# Patient Record
Sex: FEMALE | Race: WHITE | NOT HISPANIC OR LATINO | Employment: FULL TIME | URBAN - METROPOLITAN AREA
[De-identification: names, ages, dates, MRNs, and addresses within clinical notes are randomized per-mention and may not be internally consistent; named-entity substitution may affect disease eponyms.]

---

## 2024-04-08 ENCOUNTER — OFFICE VISIT (OUTPATIENT)
Dept: OPHTHALMOLOGY | Facility: CLINIC | Age: 29
End: 2024-04-08

## 2024-04-08 DIAGNOSIS — H18.622 UNSTABLE KERATOCONUS OF LEFT EYE: Primary | ICD-10-CM

## 2024-04-08 DIAGNOSIS — Z01.00 NORMAL EYE EXAM: ICD-10-CM

## 2024-04-08 LAB
CENTRAL CORNEA THICKNESS (OS): 279 MICRONS
KMAX (OD): 53.2 DIOPTERS
KMAX (OS): 81.8 DIOPTERS
PACH THINNEST (OD): 510 MICRONS
SIMK FLAT (OD): 42.6 DIOPTERS
SIMK FLAT (OS): 70 DIOPTERS
SIMK STEEP (OD): 46.1 DIOPTERS
SIMK STEEP (OS): 75.4 DIOPTERS

## 2024-04-08 PROCEDURE — V2531 CONTACT LENS GAS PERMEABLE: HCPCS | Performed by: OPTOMETRIST

## 2024-04-08 PROCEDURE — 92072 FITG C-LENS KERATOCONUS 1ST: CPT | Performed by: OPTOMETRIST

## 2024-04-08 PROCEDURE — 92025 CPTRIZED CORNEAL TOPOGRAPHY: CPT | Performed by: OPTOMETRIST

## 2024-04-08 RX ORDER — BUPROPION HYDROCHLORIDE 300 MG/1
300 TABLET ORAL DAILY
COMMUNITY

## 2024-04-08 RX ORDER — VENLAFAXINE HYDROCHLORIDE 75 MG/1
75 CAPSULE, EXTENDED RELEASE ORAL DAILY
COMMUNITY

## 2024-04-08 ASSESSMENT — CONF VISUAL FIELD
OS_INFERIOR_NASAL_RESTRICTION: 0
OD_SUPERIOR_TEMPORAL_RESTRICTION: 0
OS_SUPERIOR_NASAL_RESTRICTION: 0
OD_NORMAL: 1
OD_INFERIOR_NASAL_RESTRICTION: 0
OS_SUPERIOR_TEMPORAL_RESTRICTION: 0
OD_SUPERIOR_NASAL_RESTRICTION: 0
OD_INFERIOR_TEMPORAL_RESTRICTION: 0
OS_INFERIOR_TEMPORAL_RESTRICTION: 0
OS_NORMAL: 1

## 2024-04-08 ASSESSMENT — VISUAL ACUITY
METHOD: SNELLEN - LINEAR
OS_SC: CF 2FT
VA_OR_OS_CURRENT_RX: 20/20
VA_OR_OS_CURRENT_RX: 20/20
OD_CC: 20/25
CORRECTION_TYPE: GLASSES
OD_SC: 20/25

## 2024-04-08 ASSESSMENT — REFRACTION_CURRENTRX
OD_SPHERE: +1.00
OS_ADDL_SPECS: 3700
OD_CYLINDER: -1.75
OD_DIAMETER: 14.5
OD_AXIS: 100
OD_BASECURVE: 8.7
OD_BRAND: AIR OPTIX FOR ASTIGMATISM
OS_BASECURVE: 8.4
OS_SPHERE: -2.50
OS_BRAND: SYNERGEYES VS
OS_BRAND: SYNERGEYES VS
OS_SPHERE: PLANO
OS_DIAMETER: 16.0
OS_DIAMETER: 16.0
OS_BASECURVE: 8.4

## 2024-04-08 ASSESSMENT — REFRACTION_MANIFEST
OD_SPHERE: +1.00
OD_CYLINDER: -1.75
OS_SPHERE: UNABLE
OD_AXIS: 100

## 2024-04-08 ASSESSMENT — SLIT LAMP EXAM - LIDS
COMMENTS: NORMAL
COMMENTS: NORMAL

## 2024-04-08 ASSESSMENT — PACHYMETRY
OS_CT(UM): 489
OD_CT(UM): 592

## 2024-04-08 ASSESSMENT — EXTERNAL EXAM - RIGHT EYE: OD_EXAM: NORMAL

## 2024-04-08 ASSESSMENT — EXTERNAL EXAM - LEFT EYE: OS_EXAM: NORMAL

## 2024-04-08 ASSESSMENT — TONOMETRY
IOP_METHOD: GOLDMANN APPLANATION
OS_IOP_MMHG: 11
OD_IOP_MMHG: 13

## 2024-04-08 NOTE — Clinical Note
Both eyes (OU) Contact Lens Order  Quantity: 1 Package: RGP Appointment needed? Yes Medically necessary? Yes Ship To: [facility] Additional instructions: self pay, order scleral Left eye and trial of soft contact lens (SCL) Right eye return when in

## 2024-04-09 NOTE — PROGRESS NOTES
Assessment/Plan   Diagnoses and all orders for this visit:  Unstable keratoconus of left eye  Normal eye exam  -     Corneal Topography - OU - Both Eyes    Severe Keratoconus and risk of hydrops: Severe keratoconus can result in hydrops when fluid enters the cornea from the inside of the eye and results in sudden onset pain, light sensitivity, and  loss of vision. When then inflammation clears the vision can often be recovered, but occasionally a corneal transplant may be needed to restore vision.  The patient was educated not to rub the eye to avoid hydrops.     Fit to scleral CL Left eye and soft contact lens (SCL) Right eye since visual acuity (VA) is good Right eye and patient having CXL soon, therefore will provide limited supply Right eye and will plan to refit once CXL Completed    Strongly suggested CXL Left eye due to rapid progression of keratoconus Left eye>Right eye    Order CL and Return to check when in

## 2024-04-09 NOTE — PATIENT INSTRUCTIONS
Severe Keratoconus and risk of hydrops: Severe keratoconus can result in hydrops when fluid enters the cornea from the inside of the eye and results in sudden onset pain, light sensitivity, and  loss of vision. When then inflammation clears the vision can often be recovered, but occasionally a corneal transplant may be needed to restore vision.  The patient was educated not to rub the eye to avoid hydrops.

## 2024-05-24 ENCOUNTER — OFFICE VISIT (OUTPATIENT)
Dept: OPHTHALMOLOGY | Facility: CLINIC | Age: 29
End: 2024-05-24
Payer: MEDICARE

## 2024-05-24 DIAGNOSIS — H18.622 UNSTABLE KERATOCONUS OF LEFT EYE: Primary | ICD-10-CM

## 2024-05-24 PROCEDURE — 99070(U19) SCLERAFIL(U19): Performed by: OPTOMETRIST

## 2024-05-24 PROCEDURE — TAXCU SALES TAX CUYAHOGA COUNTY: Performed by: OPTOMETRIST

## 2024-05-24 PROCEDURE — 99212 OFFICE O/P EST SF 10 MIN: CPT | Performed by: OPTOMETRIST

## 2024-05-24 RX ORDER — SODIUM CHLORIDE FOR INHALATION 0.9 %
VIAL, NEBULIZER (ML) INHALATION
Qty: 500 ML | Refills: 3 | Status: SHIPPED | OUTPATIENT
Start: 2024-05-24

## 2024-05-24 ASSESSMENT — ENCOUNTER SYMPTOMS
ENDOCRINE NEGATIVE: 0
PSYCHIATRIC NEGATIVE: 0
CARDIOVASCULAR NEGATIVE: 0
NEUROLOGICAL NEGATIVE: 0
GASTROINTESTINAL NEGATIVE: 0
ALLERGIC/IMMUNOLOGIC NEGATIVE: 0
RESPIRATORY NEGATIVE: 0
EYES NEGATIVE: 0
CONSTITUTIONAL NEGATIVE: 0
HEMATOLOGIC/LYMPHATIC NEGATIVE: 0
MUSCULOSKELETAL NEGATIVE: 0

## 2024-05-24 ASSESSMENT — PACHYMETRY
OD_CT(UM): 592
OS_CT(UM): 489

## 2024-05-24 ASSESSMENT — REFRACTION_CURRENTRX
OS_BASECURVE: 8.4
OD_AXIS: 100
OS_SPHERE: -2.50
OD_CYLINDER: -1.75
OD_DIAMETER: 14.5
OD_BRAND: AIR OPTIX FOR ASTIGMATISM
OS_DIAMETER: 16.0
OD_BASECURVE: 8.7
OS_BRAND: SYNERGEYES VS
OS_ADDL_SPECS: 3700
OD_SPHERE: +1.00

## 2024-05-24 ASSESSMENT — VISUAL ACUITY
VA_OR_OD_CURRENT_RX: 20/25
OS_SC: CF@2FT
OD_SC: 20/30
VA_OR_OS_CURRENT_RX: 20/25
METHOD: SNELLEN - LINEAR

## 2024-05-24 ASSESSMENT — SLIT LAMP EXAM - LIDS
COMMENTS: NORMAL
COMMENTS: NORMAL

## 2024-05-24 ASSESSMENT — EXTERNAL EXAM - RIGHT EYE: OD_EXAM: NORMAL

## 2024-05-24 ASSESSMENT — EXTERNAL EXAM - LEFT EYE: OS_EXAM: NORMAL

## 2024-05-24 NOTE — PROGRESS NOTES
Assessment/Plan   Diagnoses and all orders for this visit:  Unstable keratoconus of left eye    Specialty Contact Lenses:  Specialty contact lenses were dispensed today for treatment of a medically indicated condition. The patient was educated on the proper care, handling, insertion and removal of the lenses.  The patient should follow the wearing time and return for follow-up as indicated, and call or come in to the office if the eye becomes red or painful after wearing the lenses.       Follow up 3 weeks

## 2024-05-24 NOTE — PATIENT INSTRUCTIONS
Specialty Contact Lenses:  Specialty contact lenses were dispensed today for treatment of a medically indicated condition. The patient was educated on the proper care, handling, insertion and removal of the lenses.  The patient should follow the wearing time and return for follow-up as indicated, and call or come in to the office if the eye becomes red or painful after wearing the lenses.

## 2024-06-14 ENCOUNTER — APPOINTMENT (OUTPATIENT)
Dept: OPHTHALMOLOGY | Facility: CLINIC | Age: 29
End: 2024-06-14
Payer: MEDICARE

## 2024-06-14 DIAGNOSIS — H18.622 UNSTABLE KERATOCONUS OF LEFT EYE: Primary | ICD-10-CM

## 2024-06-14 PROCEDURE — 99212 OFFICE O/P EST SF 10 MIN: CPT | Performed by: OPTOMETRIST

## 2024-06-14 ASSESSMENT — REFRACTION_CURRENTRX
OS_BRAND: SYNERGEYES VS
OS_CYLINDER: SPHERE
OD_BRAND: AIR OPTIX FOR ASTIGMATISM
OS_ADDL_SPECS: 3700
OS_BASECURVE: 8.4
OS_CYLINDER: SPHERE
OD_CYLINDER: -1.75
OS_DIAMETER: 16.0
OS_ADDL_SPECS: 3700
OS_DIAMETER: 16.0
OD_CYLINDER: -1.75
OS_BASECURVE: 8.4
OD_SPHERE: +1.00
OS_SPHERE: -1.50
OD_AXIS: 100
OS_SPHERE: -2.50
OD_BASECURVE: 8.7
OD_BRAND: AIR OPTIX FOR ASTIGMATISM
OD_SPHERE: +1.00
OD_DIAMETER: 14.5
OD_DIAMETER: 14.5
OD_BASECURVE: 8.7
OD_AXIS: 090
OS_BRAND: SYNERGEYES VS

## 2024-06-14 ASSESSMENT — VISUAL ACUITY
METHOD: SNELLEN - LINEAR
OD_CC: 20/30
OD_CC+: +2
VA_OR_OD_CURRENT_RX: 20/25
OS_CC: 20/200
CORRECTION_TYPE: GLASSES, CONTACTS
VA_OR_OS_CURRENT_RX: 20/25+1

## 2024-06-14 ASSESSMENT — CONF VISUAL FIELD
OS_INFERIOR_NASAL_RESTRICTION: 0
OS_SUPERIOR_TEMPORAL_RESTRICTION: 0
OS_NORMAL: 1
METHOD: COUNTING FINGERS
OD_SUPERIOR_NASAL_RESTRICTION: 0
OD_SUPERIOR_TEMPORAL_RESTRICTION: 0
OD_INFERIOR_TEMPORAL_RESTRICTION: 0
OD_INFERIOR_NASAL_RESTRICTION: 0
OS_SUPERIOR_NASAL_RESTRICTION: 0
OD_NORMAL: 1
OS_INFERIOR_TEMPORAL_RESTRICTION: 0

## 2024-06-14 ASSESSMENT — PACHYMETRY
OD_CT(UM): 592
OS_CT(UM): 489

## 2024-06-14 ASSESSMENT — ENCOUNTER SYMPTOMS
RESPIRATORY NEGATIVE: 0
ENDOCRINE NEGATIVE: 0
CARDIOVASCULAR NEGATIVE: 0
GASTROINTESTINAL NEGATIVE: 0
PSYCHIATRIC NEGATIVE: 0
MUSCULOSKELETAL NEGATIVE: 0
HEMATOLOGIC/LYMPHATIC NEGATIVE: 0
NEUROLOGICAL NEGATIVE: 0
CONSTITUTIONAL NEGATIVE: 0
EYES NEGATIVE: 0
ALLERGIC/IMMUNOLOGIC NEGATIVE: 0

## 2024-06-14 ASSESSMENT — SLIT LAMP EXAM - LIDS
COMMENTS: NORMAL
COMMENTS: NORMAL

## 2024-06-14 ASSESSMENT — EXTERNAL EXAM - LEFT EYE: OS_EXAM: NORMAL

## 2024-06-14 ASSESSMENT — EXTERNAL EXAM - RIGHT EYE: OD_EXAM: NORMAL

## 2024-06-14 NOTE — Clinical Note
Both eyes (OU) Contact Lens Order  Quantity: 1 Package: RGP Appointment needed? No Medically necessary? Yes Ship To: Home Additional instructions: order new soft trial Right eye and new Scleral Left eye power change only and Mail to patient (see Rx2)

## 2024-06-14 NOTE — PROGRESS NOTES
Assessment/Plan   Diagnoses and all orders for this visit:  Unstable keratoconus of left eye      Mail new power Both eyes CLs  Follow up 5 weeks

## 2024-07-01 DIAGNOSIS — F39 MOOD DISORDER (CMS-HCC): Primary | ICD-10-CM

## 2024-07-01 NOTE — LETTER
July 11, 2024    Fallon Matute  [address] [place] [place] [address]      Dear Ms. Matute:    Thank you for choosing [facility] as your primary care provider.    Our records indicate that you are due for an appointment for either a preventative visit or as a follow up for a chronic condition.  The goal of [facility] is to provide high-quality personalized care.  To do so, we need to evaluate the healthcare needs of our patients each year.  We are concerned about your health and wellness and would like to provide you with the best possible medical care.    Please call our office to schedule an appointment, [phone], opt.1.    Sincerely,  Norah Lane PA-C

## 2024-07-02 DIAGNOSIS — F39 MOOD DISORDER (CMS-HCC): ICD-10-CM

## 2024-07-02 RX ORDER — VENLAFAXINE 75 MG/1
75 TABLET ORAL DAILY
Qty: 90 TABLET | Refills: 0 | OUTPATIENT
Start: 2024-07-02

## 2024-07-02 RX ORDER — VENLAFAXINE HYDROCHLORIDE 75 MG/1
75 CAPSULE, EXTENDED RELEASE ORAL DAILY
Qty: 30 CAPSULE | Refills: 0 | Status: SHIPPED | OUTPATIENT
Start: 2024-07-02

## 2024-07-02 RX ORDER — BUPROPION HYDROCHLORIDE 300 MG/1
300 TABLET ORAL
Qty: 90 TABLET | Refills: 0 | OUTPATIENT
Start: 2024-07-02

## 2024-07-02 RX ORDER — BUPROPION HYDROCHLORIDE 300 MG/1
300 TABLET ORAL
Qty: 30 TABLET | Refills: 0 | Status: SHIPPED | OUTPATIENT
Start: 2024-07-02

## 2024-07-02 RX ORDER — VENLAFAXINE 75 MG/1
75 TABLET ORAL DAILY
Qty: 30 TABLET | Refills: 0 | Status: SHIPPED | OUTPATIENT
Start: 2024-07-02

## 2024-07-02 RX ORDER — BUPROPION HYDROCHLORIDE 300 MG/1
300 TABLET ORAL DAILY
Qty: 30 TABLET | Refills: 0 | Status: SHIPPED | OUTPATIENT
Start: 2024-07-02 | End: 2024-07-02

## 2024-07-02 NOTE — TELEPHONE ENCOUNTER
Left voice message to notify pt that she is due for physical and call office to schedule an appt.

## 2024-07-19 ENCOUNTER — APPOINTMENT (OUTPATIENT)
Dept: OPHTHALMOLOGY | Facility: CLINIC | Age: 29
End: 2024-07-19
Payer: MEDICARE

## 2024-07-19 DIAGNOSIS — H18.622 UNSTABLE KERATOCONUS OF LEFT EYE: Primary | ICD-10-CM

## 2024-07-19 PROCEDURE — 99212 OFFICE O/P EST SF 10 MIN: CPT | Performed by: OPTOMETRIST

## 2024-07-19 ASSESSMENT — ENCOUNTER SYMPTOMS
RESPIRATORY NEGATIVE: 0
ALLERGIC/IMMUNOLOGIC NEGATIVE: 0
NEUROLOGICAL NEGATIVE: 0
ENDOCRINE NEGATIVE: 0
CARDIOVASCULAR NEGATIVE: 0
PSYCHIATRIC NEGATIVE: 0
CONSTITUTIONAL NEGATIVE: 0
MUSCULOSKELETAL NEGATIVE: 0
GASTROINTESTINAL NEGATIVE: 0
EYES NEGATIVE: 0
HEMATOLOGIC/LYMPHATIC NEGATIVE: 0

## 2024-07-19 ASSESSMENT — SLIT LAMP EXAM - LIDS
COMMENTS: NORMAL
COMMENTS: NORMAL

## 2024-07-19 ASSESSMENT — REFRACTION_MANIFEST
OD_SPHERE: +1.00
OS_SPHERE: PLANO
OD_AXIS: 100
OD_CYLINDER: -1.75

## 2024-07-19 ASSESSMENT — VISUAL ACUITY
METHOD: SNELLEN - LINEAR
VA_OR_OS_CURRENT_RX: 20/25
CORRECTION_TYPE: GLASSES, CONTACTS
OS_CC: 20/25
OD_CC: 20/20
OD_CC+: -2

## 2024-07-19 ASSESSMENT — PACHYMETRY
OS_CT(UM): 489
OD_CT(UM): 592

## 2024-07-19 ASSESSMENT — REFRACTION_CURRENTRX
OS_ADDL_SPECS: 3700
OS_BRAND: SYNERGEYES VS
OS_SPHERE: -1.50
OS_DIAMETER: 16.0
OS_CYLINDER: SPHERE
OS_BASECURVE: 8.4

## 2024-07-19 ASSESSMENT — EXTERNAL EXAM - RIGHT EYE: OD_EXAM: NORMAL

## 2024-07-19 ASSESSMENT — EXTERNAL EXAM - LEFT EYE: OS_EXAM: NORMAL

## 2024-07-29 ENCOUNTER — OFFICE VISIT (OUTPATIENT)
Dept: PRIMARY CARE | Facility: CLINIC | Age: 29
End: 2024-07-29
Payer: MEDICARE

## 2024-07-29 ENCOUNTER — LAB (OUTPATIENT)
Dept: LAB | Facility: LAB | Age: 29
End: 2024-07-29
Payer: MEDICARE

## 2024-07-29 VITALS
SYSTOLIC BLOOD PRESSURE: 134 MMHG | HEART RATE: 83 BPM | BODY MASS INDEX: 33.81 KG/M2 | OXYGEN SATURATION: 98 % | WEIGHT: 197 LBS | DIASTOLIC BLOOD PRESSURE: 82 MMHG

## 2024-07-29 DIAGNOSIS — R12 HEARTBURN: ICD-10-CM

## 2024-07-29 DIAGNOSIS — Z00.00 ROUTINE MEDICAL EXAM: Primary | ICD-10-CM

## 2024-07-29 DIAGNOSIS — Z13.220 LIPID SCREENING: ICD-10-CM

## 2024-07-29 DIAGNOSIS — E55.9 VITAMIN D DEFICIENCY: ICD-10-CM

## 2024-07-29 DIAGNOSIS — R73.01 IFG (IMPAIRED FASTING GLUCOSE): ICD-10-CM

## 2024-07-29 DIAGNOSIS — J30.89 NON-SEASONAL ALLERGIC RHINITIS, UNSPECIFIED TRIGGER: ICD-10-CM

## 2024-07-29 LAB
25(OH)D3 SERPL-MCNC: 34 NG/ML (ref 31–100)
ALBUMIN SERPL-MCNC: 4.6 G/DL (ref 3.5–5)
ALP BLD-CCNC: 47 U/L (ref 35–125)
ALT SERPL-CCNC: 23 U/L (ref 5–40)
ANION GAP SERPL CALC-SCNC: 13 MMOL/L
AST SERPL-CCNC: 16 U/L (ref 5–40)
BILIRUB SERPL-MCNC: 0.4 MG/DL (ref 0.1–1.2)
BUN SERPL-MCNC: 14 MG/DL (ref 8–25)
CALCIUM SERPL-MCNC: 9.9 MG/DL (ref 8.5–10.4)
CHLORIDE SERPL-SCNC: 104 MMOL/L (ref 97–107)
CHOLEST SERPL-MCNC: 198 MG/DL (ref 133–200)
CHOLEST/HDLC SERPL: 4.6 {RATIO}
CO2 SERPL-SCNC: 25 MMOL/L (ref 24–31)
CREAT SERPL-MCNC: 1 MG/DL (ref 0.4–1.6)
EGFRCR SERPLBLD CKD-EPI 2021: 79 ML/MIN/1.73M*2
EST. AVERAGE GLUCOSE BLD GHB EST-MCNC: 105 MG/DL
GLUCOSE SERPL-MCNC: 105 MG/DL (ref 65–99)
HBA1C MFR BLD: 5.3 %
HDLC SERPL-MCNC: 43 MG/DL
LDLC SERPL CALC-MCNC: 134 MG/DL (ref 65–130)
POTASSIUM SERPL-SCNC: 4.3 MMOL/L (ref 3.4–5.1)
PROT SERPL-MCNC: 6.8 G/DL (ref 5.9–7.9)
SODIUM SERPL-SCNC: 142 MMOL/L (ref 133–145)
TRIGL SERPL-MCNC: 104 MG/DL (ref 40–150)

## 2024-07-29 PROCEDURE — 80061 LIPID PANEL: CPT

## 2024-07-29 PROCEDURE — 99395 PREV VISIT EST AGE 18-39: CPT | Performed by: PHYSICIAN ASSISTANT

## 2024-07-29 PROCEDURE — 82306 VITAMIN D 25 HYDROXY: CPT

## 2024-07-29 PROCEDURE — 80053 COMPREHEN METABOLIC PANEL: CPT

## 2024-07-29 PROCEDURE — 1036F TOBACCO NON-USER: CPT | Performed by: PHYSICIAN ASSISTANT

## 2024-07-29 PROCEDURE — 83036 HEMOGLOBIN GLYCOSYLATED A1C: CPT

## 2024-07-29 ASSESSMENT — PATIENT HEALTH QUESTIONNAIRE - PHQ9
1. LITTLE INTEREST OR PLEASURE IN DOING THINGS: NOT AT ALL
2. FEELING DOWN, DEPRESSED OR HOPELESS: NOT AT ALL
SUM OF ALL RESPONSES TO PHQ9 QUESTIONS 1 AND 2: 0

## 2024-07-29 ASSESSMENT — COLUMBIA-SUICIDE SEVERITY RATING SCALE - C-SSRS
6. HAVE YOU EVER DONE ANYTHING, STARTED TO DO ANYTHING, OR PREPARED TO DO ANYTHING TO END YOUR LIFE?: NO
1. IN THE PAST MONTH, HAVE YOU WISHED YOU WERE DEAD OR WISHED YOU COULD GO TO SLEEP AND NOT WAKE UP?: NO
2. HAVE YOU ACTUALLY HAD ANY THOUGHTS OF KILLING YOURSELF?: NO

## 2024-07-29 ASSESSMENT — ENCOUNTER SYMPTOMS
SHORTNESS OF BREATH: 0
ABDOMINAL PAIN: 0
BLOOD IN STOOL: 0
LIGHT-HEADEDNESS: 0
DIZZINESS: 0

## 2024-07-29 ASSESSMENT — PAIN SCALES - GENERAL: PAINLEVEL: 0-NO PAIN

## 2024-07-29 NOTE — PROGRESS NOTES
Subjective   Patient ID: Fallon Matute is a 28 y.o. female who presents for Annual Exam (Pt is here for physical / nr) and Allergies.    HPI   Here for CPE.     Mood disorder - stable on wellbutrin, venlafaxine    Allergies - hx hay fever, sxs worsening. Rhinorrhea, itchy/dry eyes, sore throat. Previously tx w/zyrtec but no longer effective. Would like to see a specialist.    Heartburn - occurs a few x per month. Has made dietary adjustments and sxs improved. Drinks baking soda as needed which helps. She does admit to daily caffeine. No excessive EtOH. Denies bloody stool, vomiting, unexplained weight loss.    Review of Systems   Respiratory:  Negative for shortness of breath.    Cardiovascular:  Negative for chest pain.   Gastrointestinal:  Negative for abdominal pain and blood in stool.   Skin:  Negative for rash.   Neurological:  Negative for dizziness and light-headedness.       Objective   /82   Pulse 83   Wt 89.4 kg (197 lb)   SpO2 98%   BMI 33.81 kg/m²     Physical Exam  Constitutional:       Appearance: Normal appearance.   HENT:      Head: Normocephalic and atraumatic.   Eyes:      Extraocular Movements: Extraocular movements intact.      Conjunctiva/sclera: Conjunctivae normal.   Cardiovascular:      Rate and Rhythm: Normal rate and regular rhythm.      Heart sounds: Normal heart sounds.   Pulmonary:      Effort: Pulmonary effort is normal.      Breath sounds: Normal breath sounds. No wheezing or rhonchi.   Musculoskeletal:      Cervical back: Normal range of motion and neck supple.   Skin:     General: Skin is warm.      Findings: No rash.   Neurological:      General: No focal deficit present.      Mental Status: She is alert.         Assessment/Plan   Problem List Items Addressed This Visit    None  Visit Diagnoses         Codes    Routine medical exam    -  Primary Z00.00    IFG (impaired fasting glucose)     R73.01    Relevant Orders    Comprehensive Metabolic Panel    Hemoglobin A1C     Lipid screening     Z13.220    Relevant Orders    Lipid Panel    Vitamin D deficiency     E55.9    Relevant Orders    Vitamin D 25-Hydroxy,Total (for eval of Vitamin D levels)    Non-seasonal allergic rhinitis, unspecified trigger     J30.89    Relevant Orders    Referral to Allergy    Heartburn     R12          Advised to reach out if heartburn sxs worsening and may consider H. Pylori testing/EGD

## 2024-09-03 DIAGNOSIS — F39 MOOD DISORDER (CMS-HCC): ICD-10-CM

## 2024-09-03 RX ORDER — VENLAFAXINE 75 MG/1
75 TABLET ORAL DAILY
Qty: 90 TABLET | Refills: 3 | Status: SHIPPED | OUTPATIENT
Start: 2024-09-03

## 2024-09-03 RX ORDER — BUPROPION HYDROCHLORIDE 300 MG/1
300 TABLET ORAL DAILY
Qty: 90 TABLET | Refills: 3 | Status: SHIPPED | OUTPATIENT
Start: 2024-09-03

## 2024-10-17 ENCOUNTER — APPOINTMENT (OUTPATIENT)
Dept: ALLERGY | Facility: CLINIC | Age: 29
End: 2024-10-17
Payer: MEDICARE

## 2024-10-17 VITALS
SYSTOLIC BLOOD PRESSURE: 117 MMHG | HEIGHT: 64 IN | BODY MASS INDEX: 33.9 KG/M2 | HEART RATE: 84 BPM | WEIGHT: 198.6 LBS | DIASTOLIC BLOOD PRESSURE: 70 MMHG | TEMPERATURE: 97.5 F

## 2024-10-17 DIAGNOSIS — J30.81 ALLERGIC RHINITIS DUE TO ANIMAL HAIR AND DANDER: Primary | ICD-10-CM

## 2024-10-17 DIAGNOSIS — J30.89 ALLERGIC RHINITIS DUE TO DUST MITE: ICD-10-CM

## 2024-10-17 DIAGNOSIS — H10.13 ALLERGIC CONJUNCTIVITIS OF BOTH EYES: ICD-10-CM

## 2024-10-17 PROCEDURE — 95004 PERQ TESTS W/ALRGNC XTRCS: CPT | Performed by: ALLERGY & IMMUNOLOGY

## 2024-10-17 PROCEDURE — 99204 OFFICE O/P NEW MOD 45 MIN: CPT | Performed by: ALLERGY & IMMUNOLOGY

## 2024-10-17 PROCEDURE — 95024 IQ TESTS W/ALLERGENIC XTRCS: CPT | Performed by: ALLERGY & IMMUNOLOGY

## 2024-10-17 RX ORDER — CETIRIZINE HYDROCHLORIDE 10 MG/1
10 TABLET ORAL DAILY PRN
Qty: 30 TABLET | Refills: 11 | Status: SHIPPED | OUTPATIENT
Start: 2024-10-17 | End: 2025-10-17

## 2024-10-17 RX ORDER — FLUTICASONE PROPIONATE 50 MCG
2 SPRAY, SUSPENSION (ML) NASAL DAILY
Qty: 16 G | Refills: 11 | Status: SHIPPED | OUTPATIENT
Start: 2024-10-17 | End: 2025-10-17

## 2024-10-17 RX ORDER — OLOPATADINE HYDROCHLORIDE 2 MG/ML
1 SOLUTION/ DROPS OPHTHALMIC DAILY PRN
Qty: 2.5 ML | Refills: 5 | Status: SHIPPED | OUTPATIENT
Start: 2024-10-17 | End: 2025-10-17

## 2024-10-17 ASSESSMENT — ENCOUNTER SYMPTOMS
CONSTITUTIONAL NEGATIVE: 1
GASTROINTESTINAL NEGATIVE: 1
EYES NEGATIVE: 1
MUSCULOSKELETAL NEGATIVE: 1
ALLERGIC/IMMUNOLOGIC NEGATIVE: 1
HEMATOLOGIC/LYMPHATIC NEGATIVE: 1
CARDIOVASCULAR NEGATIVE: 1
RESPIRATORY NEGATIVE: 1

## 2024-10-17 ASSESSMENT — PAIN SCALES - GENERAL: PAINLEVEL_OUTOF10: 0-NO PAIN

## 2024-10-17 NOTE — PROGRESS NOTES
"Fallon Matute presents for initial evaluation today.      Fallon Matute was seen at the request of Cassie Garcia DO for a chief complaint of concern for environmental allergies; a report with my findings is being sent via written or electronic means to Cassie Garcia DO with my recommendations for treatment    She provides the following history:    She reports that she gets itchy eyes, runny nose, sneezing, nasal congestion, and dry itchy throat occurring year round, worse with change in seasons.  She reports that she is also bothered by cats.  She has 2 cats at home, 1 dog at home that enter bedroom. She has tried Zyrtec, Allegra which help a little.  She is not on a nasal spray.     Asthma: Denies     Eczema: Denies    Food allergy: She reports a history of shellfish allergy.  She had hives after eating shrimp 5 years ago and has since avoided. She reports that she has lactose intolerance     Venom allergy:  Denies     Drug allergy: Sulfa medication as a child caused hives. Latex causes hives    Environmental History:  Type of home:  [facility]  Pets in the house: Dog (1) and cats (2)  Mold or moisture in the home: None  Bedroom ken: Carpet  Dust mite covers on bed:  Yes on mattress  Cigarette exposure in the home:  No  Occupation/School: Works at a [employer]    Pertinent Allergy/Immunology family history:  Dad with shellfish allergy  Maternal grandmother with seasonal allergies     Review of Systems   Constitutional: Negative.    HENT: Negative.     Eyes: Negative.    Respiratory: Negative.     Cardiovascular: Negative.    Gastrointestinal: Negative.    Musculoskeletal: Negative.    Skin: Negative.    Allergic/Immunologic: Negative.    Hematological: Negative.        Vital signs:  /70   Pulse 84   Temp 36.4 °C (97.5 °F)   Ht 1.626 m (5' 4\")   Wt 90.1 kg (198 lb 9.6 oz)   BMI 34.09 kg/m²     Physical Exam:  GENERAL: Alert, oriented and in no acute distress.     HEENT: EYES: No " conjunctival injection or cobblestoning. Nose: nasal turbinates are not edematous and are not boggy.  There is no mucous stranding, polyps, or blood    noted. EARS: Tympanic membranes are clear. MOUTH: moist and pink with no exudates, ulcers, or thrush. NECK: No upper airway stridor noted.       HEART: regular rate and rhythm.       LUNGS: Clear to auscultation bilaterally. No wheezing, rhonchi or rales.        ABDOMEN: Positive bowel sounds, soft, nontender, nondistended.       EXTREMITIES: No clubbing or edema.        NEURO:  Normal affect.  Gait normal.      SKIN: No rash, hives, or angioedema noted  Environmental Allergy Testing:  Test Results (wheal/flare in mm)    Controls  Controls  Histamine: 5x>25  Negative: 0x0    Trees:  Trees  American Beech: 0x0  [place]: 0x0  Birch: 0x0  Black [place]: 0x0  [place]: 0x0  [place]: 0x0  [place]: 0x0  Elm: 0x0  Hickory: 0x0  Maple: 0x0  C[place]: 0x0  Lavinia: 0x0  [place]: 0x0  White poplar: 0x0     Grasses:  Grass  Bahia: 0x0  Berm[place]: 0x0  Andrew: 0x0  KORT Grass Mix: 0x0  Sweet Vernal: 0x0    Weeds:  Weeds  Cocklebur: 0x0  Dandelion: 0x0  English Plantain: 0x0  Goldenrod: 0x0  Lambs Quarters: 0x0  Mugwort: 0x0  Pigweed: 0x0  Ragweed Mix: 0x0  Russian Thistle: 0x0  Yellow Dock: 0x0     Molds:  Molds  Alternaria: 0x0  Aspergillus: 0x0  Cladosporium: 0x0  Helminthosporium: 0x0  Penicillium: 0x0  Stemphyllium: 0x0    Animals/Dust Mite:  Animals  Cat: 0x0  AP dog: 0x0  Nix Dog: 0x0  Mouse: 0x0  Cockroach: 0x0  Dust Mite F: 5x10  Dust Mite P: 0x0     Intradermal Test Results  Animals/Mites/Cockroach/Others  AP Dox>25  Cat: 11x>25  Dust Mite P: 7x25      Impression:  1. Allergic rhinitis due to animal hair and dander    2. Allergic rhinitis due to dust mite    3. Allergic conjunctivitis of both eyes      Assessment and Plan:  Allergic rhinitis, perennial: Fallon was found to have significant sensitivity to cat, dog, dust mite on aeroallergen skin prick testing  today.  We discussed treatments for allergic rhinitis to include avoidance, medication, and allergen immunotherapy.  We discussed interventions for dust mite control, and provided  information on dust mite encasements for the bedding.  Recommend starting Flonase 2 sprays each nostril once daily and cetirizine 10 mg once daily as needed.  We reviewed proper nasal spray administration technique. We also discussed starting nasal saline sinus rinse, and have provided with information on this.  If Fallon does not achieve adequate control with medication and/or would like to decrease overall medication use, allergen immunotherapy would be an option to consider in the future.    Allergic conjunctivitis: Prescribed olopatadine 0.2% eyedrops 1 drop each eye daily as needed    Plan for follow up in 3 months or sooner if needed

## 2024-10-17 NOTE — LETTER
October 17, 2024     Norah Lane PA-C  [address] [address]  [place] [address]    Patient: Fallon Matute   YOB: 1995   Date of Visit: 10/17/2024       Dear Dr. Norah Lane PA-C:    Thank you for referring Fallon Matute to me for evaluation. Below are my notes for this consultation.  If you have questions, please do not hesitate to call me. I look forward to following your patient along with you.       Sincerely,     Princess CAIT Li MD      CC: No Recipients  ______________________________________________________________________________________    Fallon Matute presents for initial evaluation today.      Fallon Matute was seen at the request of Cassie Garcia DO for a chief complaint of concern for environmental allergies; a report with my findings is being sent via written or electronic means to Cassie Garcia DO with my recommendations for treatment    She provides the following history:    She reports that she gets itchy eyes, runny nose, sneezing, nasal congestion, and dry itchy throat occurring year round, worse with change in seasons.  She reports that she is also bothered by cats.  She has 2 cats at home, 1 dog at home that enter bedroom. She has tried Zyrtec, Allegra which help a little.  She is not on a nasal spray.     Asthma: Denies     Eczema: Denies    Food allergy: She reports a history of shellfish allergy.  She had hives after eating shrimp 5 years ago and has since avoided. She reports that she has lactose intolerance     Venom allergy:  Denies     Drug allergy: Sulfa medication as a child caused hives. Latex causes hives    Environmental History:  Type of home:  [facility]  Pets in the house: Dog (1) and cats (2)  Mold or moisture in the home: None  Bedroom ken: Carpet  Dust mite covers on bed:  Yes on mattress  Cigarette exposure in the home:  No  Occupation/School: Works at a [employer]    Pertinent Allergy/Immunology family history:  Dad with  "shellfish allergy  Maternal grandmother with seasonal allergies     Review of Systems   Constitutional: Negative.    HENT: Negative.     Eyes: Negative.    Respiratory: Negative.     Cardiovascular: Negative.    Gastrointestinal: Negative.    Musculoskeletal: Negative.    Skin: Negative.    Allergic/Immunologic: Negative.    Hematological: Negative.        Vital signs:  /70   Pulse 84   Temp 36.4 °C (97.5 °F)   Ht 1.626 m (5' 4\")   Wt 90.1 kg (198 lb 9.6 oz)   BMI 34.09 kg/m²     Physical Exam:  GENERAL: Alert, oriented and in no acute distress.     HEENT: EYES: No conjunctival injection or cobblestoning. Nose: nasal turbinates are not edematous and are not boggy.  There is no mucous stranding, polyps, or blood    noted. EARS: Tympanic membranes are clear. MOUTH: moist and pink with no exudates, ulcers, or thrush. NECK: No upper airway stridor noted.       HEART: regular rate and rhythm.       LUNGS: Clear to auscultation bilaterally. No wheezing, rhonchi or rales.        ABDOMEN: Positive bowel sounds, soft, nontender, nondistended.       EXTREMITIES: No clubbing or edema.        NEURO:  Normal affect.  Gait normal.      SKIN: No rash, hives, or angioedema noted  Environmental Allergy Testing:  Test Results (wheal/flare in mm)    Controls  Controls  Histamine: 5x>25  Negative: 0x0    Trees:  Trees  American Beech: 0x0  Bonilla: 0x0  Birch: 0x0  Black [place]: 0x0  [place]: 0x0  [place]: 0x0  [place]: 0x0  Elm: 0x0  Hickory: 0x0  Maple: 0x0  [place]: 0x0  [place]: 0x0  [place]: 0x0  White poplar: 0x0     Grasses:  Grass  Bahia: 0x0  [place]: 0x0  Andrew: 0x0  KORT Grass Mix: 0x0  Sweet Vernal: 0x0    Weeds:  Weeds  Cocklebur: 0x0  Dandelion: 0x0  English Plantain: 0x0  Goldenrod: 0x0  Lambs Quarters: 0x0  Mugwort: 0x0  Pigweed: 0x0  Ragweed Mix: 0x0  Russian Thistle: 0x0  Yellow Dock: 0x0     Molds:  Molds  Alternaria: 0x0  Aspergillus: 0x0  Cladosporium: 0x0  Helminthosporium: 0x0  Penicillium: " 0x0  Stemphyllium: 0x0    Animals/Dust Mite:  Animals  Cat: 0x0  AP dog: 0x0  Nix Dog: 0x0  Mouse: 0x0  Cockroach: 0x0  Dust Mite F: 5x10  Dust Mite P: 0x0     Intradermal Test Results  Animals/Mites/Cockroach/Others  AP Dox>25  Cat: 11x>25  Dust Mite P: 7x25      Impression:  1. Allergic rhinitis due to animal hair and dander    2. Allergic rhinitis due to dust mite    3. Allergic conjunctivitis of both eyes      Assessment and Plan:  Allergic rhinitis, perennial: Fallon was found to have significant sensitivity to cat, dog, dust mite on aeroallergen skin prick testing today.  We discussed treatments for allergic rhinitis to include avoidance, medication, and allergen immunotherapy.  We discussed interventions for dust mite control, and provided  information on dust mite encasements for the bedding.  Recommend starting Flonase 2 sprays each nostril once daily and cetirizine 10 mg once daily as needed.  We reviewed proper nasal spray administration technique. We also discussed starting nasal saline sinus rinse, and have provided with information on this.  If Fallon does not achieve adequate control with medication and/or would like to decrease overall medication use, allergen immunotherapy would be an option to consider in the future.    Allergic conjunctivitis: Prescribed olopatadine 0.2% eyedrops 1 drop each eye daily as needed    Plan for follow up in 3 months or sooner if needed

## 2024-10-17 NOTE — PATIENT INSTRUCTIONS
It was nice to meet you today    Your environmental allergy testing today was positive to dust mite, cat, and dog.  Please see below for environmental allergen avoidance recommendations.     Recommend starting Flonase 2 sprays each nostril once daily    You may use Cetirizine (Zyrtec) 10 mg once daily as needed     You may use olopatadine 0.2% eyedrops 1 drop each eye daily as needed    Try nasal saline sinus rinses- recommend Te Med nasal saline rinses.  Must use distilled water for this.    If symptoms are not well controlled, you can consider allergy shots and we can discuss further next visit     We would like to see you in follow up in 3 months or sooner if needed    Environmental Allergy Testing:  Test Results (wheal/flare in mm)    Controls  Controls  Histamine: 5x>25  Negative: 0x0    Trees:  Trees  American Beech: 0x0  [place]: 0x0  Birch: 0x0  Black [place]: 0x0  [place]: 0x0  [place]: 0x0  [place]astern [place]: 0x0  Elm: 0x0  Hickory: 0x0  Maple: 0x0  [place]: 0x0  [place]: 0x0  [place]: 0x0  White poplar: 0x0     Grasses:  Grass  Bahia: 0x0  [place]: 0x0  [place]: 0x0  KORT Grass Mix: 0x0  Sweet Vernal: 0x0    Weeds:  Weeds  Cocklebur: 0x0  Dandelion: 0x0  English Plantain: 0x0  Goldenrod: 0x0  Lambs Quarters: 0x0  Mugwort: 0x0  Pigweed: 0x0  Ragweed Mix: 0x0  Russian Thistle: 0x0  Yellow Dock: 0x0     Molds:  Molds  Alternaria: 0x0  Aspergillus: 0x0  Cladosporium: 0x0  Helminthosporium: 0x0  Penicillium: 0x0  Stemphyllium: 0x0    Animals/Dust Mite:  Animals  Cat: 0x0  AP dog: 0x0  Nix Dog: 0x0  Mouse: 0x0  Cockroach: 0x0  Dust Mite F: 5x10  Dust Mite P: 0x0     Intradermal Test Results  Animals/Mites/Cockroach/Others  AP Dox>25  Cat: 11x>25  Dust Mite P: 7x25    Thank you for your visit to the [facility].  Part of a successful visit is taking home the information you learned today and using it to make things better.  These take home  instructions are to help you, if you have questions or concerns, please contact us.     Please see below for recommendations on environmental allergy control or modification:     Pet Avoidance     Keep pets out of the bedroom at all times.     Keep pets off of furniture and other surfaces like counter tops.     More frequent bathing can help      Groom your pet outside so hair and dander stay outside the home when brushed.     Consider using HEPA air purifier in bedroom     Dust mite control.            1.  Dust mite proof covers/encasing on the mattress, pillow and box spring.            2.  Wash sheets and bed linens in hot water each week.          3.  Vacuum carpets in bedroom each week.          4.  Dust or wipe down any hard surfaces.          5.  Avoid using a humidifier in the bedroom      Limit Cigarette smoke exposure.  Smoking and second hand smoke can irritate the          nose and sinuses.  You and the people around you will benefit from avoiding          cigarette smoke.

## 2024-12-26 ENCOUNTER — APPOINTMENT (OUTPATIENT)
Dept: ALLERGY | Facility: CLINIC | Age: 29
End: 2024-12-26
Payer: MEDICARE

## 2025-07-25 ENCOUNTER — APPOINTMENT (OUTPATIENT)
Dept: OPHTHALMOLOGY | Facility: CLINIC | Age: 30
End: 2025-07-25
Payer: MEDICARE

## 2025-07-25 DIAGNOSIS — H18.623 UNSTABLE KERATOCONUS OF BOTH EYES: ICD-10-CM

## 2025-07-25 DIAGNOSIS — H18.622 UNSTABLE KERATOCONUS OF LEFT EYE: Primary | ICD-10-CM

## 2025-07-25 LAB
CENTRAL CORNEA THICKNESS (OD): 495 MICRONS
CENTRAL CORNEA THICKNESS (OS): 301 MICRONS
KMAX (OD): 58.1 DIOPTERS
KMAX (OS): 92 DIOPTERS
PACH THINNEST (OD): 484 MICRONS
PACH THINNEST (OS): 295 MICRONS
POSTERIOR FLOAT (OD): 29 MICRONS
POSTERIOR FLOAT (OS): 128 MICRONS
SIMK FLAT (OD): 44.4 DIOPTERS
SIMK FLAT (OS): 71.5 DIOPTERS
SIMK STEEP (OD): 47.7 DIOPTERS
SIMK STEEP (OS): 81.8 DIOPTERS
SIMK STEEP AXIS (OD): 105.9 DEGREES
SIMK STEEP AXIS (OS): 41.5 DEGREES

## 2025-07-25 PROCEDURE — 92025 CPTRIZED CORNEAL TOPOGRAPHY: CPT | Performed by: OPTOMETRIST

## 2025-07-25 PROCEDURE — 92014 COMPRE OPH EXAM EST PT 1/>: CPT | Performed by: OPTOMETRIST

## 2025-07-25 ASSESSMENT — VISUAL ACUITY
METHOD: SNELLEN - LINEAR
OD_CC: 20/25
CORRECTION_TYPE: GLASSES, CONTACTS
OD_CC+: +1
OS_PH_CC+: -1
OS_CC: 20/70
OS_CC+: -1
VA_OR_OS_CURRENT_RX: 20/30-2
OS_CC: 20/40
OS_PH_CC: 20/30
OD_CC: 20/25

## 2025-07-25 ASSESSMENT — REFRACTION_WEARINGRX
OD_AXIS: 100
OD_CYLINDER: -1.75
OD_SPHERE: +1.00
OS_SPHERE: PLANO

## 2025-07-25 ASSESSMENT — REFRACTION_CURRENTRX
OS_BRAND: SYNERGEYES VS
OS_SPHERE: -1.50
OS_DIAMETER: 16.0
OS_BRAND: SYNERGEYES VS
OS_CYLINDER: SPHERE
OS_CYLINDER: SPHERE
OS_SPHERE: -2.00
OS_ADDL_SPECS: 3700
OS_ADDL_SPECS: 3700
OS_BASECURVE: 8.4
OS_BASECURVE: 8.4
OS_DIAMETER: 16.0

## 2025-07-25 ASSESSMENT — REFRACTION_MANIFEST
OD_CYLINDER: -1.75
OD_AXIS: 095
OD_SPHERE: +0.50

## 2025-07-25 ASSESSMENT — ENCOUNTER SYMPTOMS
CARDIOVASCULAR NEGATIVE: 0
ALLERGIC/IMMUNOLOGIC NEGATIVE: 0
PSYCHIATRIC NEGATIVE: 0
HEMATOLOGIC/LYMPHATIC NEGATIVE: 0
ENDOCRINE NEGATIVE: 0
NEUROLOGICAL NEGATIVE: 0
CONSTITUTIONAL NEGATIVE: 0
EYES NEGATIVE: 1
GASTROINTESTINAL NEGATIVE: 0
RESPIRATORY NEGATIVE: 0
MUSCULOSKELETAL NEGATIVE: 0

## 2025-07-25 ASSESSMENT — EXTERNAL EXAM - LEFT EYE: OS_EXAM: NORMAL

## 2025-07-25 ASSESSMENT — CONF VISUAL FIELD
OD_SUPERIOR_NASAL_RESTRICTION: 0
OS_SUPERIOR_NASAL_RESTRICTION: 0
OS_SUPERIOR_TEMPORAL_RESTRICTION: 0
OD_INFERIOR_NASAL_RESTRICTION: 0
OS_INFERIOR_NASAL_RESTRICTION: 0
OD_NORMAL: 1
METHOD: COUNTING FINGERS
OS_INFERIOR_TEMPORAL_RESTRICTION: 0
OD_INFERIOR_TEMPORAL_RESTRICTION: 0
OD_SUPERIOR_TEMPORAL_RESTRICTION: 0
OS_NORMAL: 1

## 2025-07-25 ASSESSMENT — TONOMETRY
OD_IOP_MMHG: 13
IOP_METHOD: GOLDMANN APPLANATION
OS_IOP_MMHG: 11

## 2025-07-25 ASSESSMENT — SLIT LAMP EXAM - LIDS
COMMENTS: NORMAL
COMMENTS: NORMAL

## 2025-07-25 ASSESSMENT — PACHYMETRY
OS_CT(UM): 489
OD_CT(UM): 592

## 2025-07-25 ASSESSMENT — CUP TO DISC RATIO
OS_RATIO: 0.35
OD_RATIO: 0.30

## 2025-07-25 ASSESSMENT — EXTERNAL EXAM - RIGHT EYE: OD_EXAM: NORMAL

## 2025-07-25 NOTE — PROGRESS NOTES
Assessment/Plan   Diagnoses and all orders for this visit:  Unstable keratoconus of left eye  -     Corneal Topography - OU - Both Eyes  Keratoconus of right eye    Established patient presents for yearly CL evaluation. Wearing Synergeyes lens in OS, approx 1 year old. Attempted fitting OD with SCL but could not tolerate; wears specs with OD correction over OS CL currently.    Progression in [facility] noted OD/OS on topography today with significant thinning OS compared to 04/2024 visit. Recommended evaluation for CXL with Dr. Dougherty at [place] for Right eye but patient did not pursue. 20/30-2 BCVA OS with both SOR and SCOR over CL on exam today, 20/20 BCVA OD with MR. HAUSER unremarkable. Strongly recommended that patient pursue CXL evaluation for Right eye at [facility] with Dr. Dougherty. Patient is currently uninsured, but states she will work on scheduling after establishing health insurance. Patient defers ordering new OS CL at this time; minimal depositing noted, can call to order OS CL when ready with data from today's exam.    Severe Keratoconus and risk of hydrops: Severe keratoconus can result in hydrops when fluid enters the cornea from the inside of the eye and results in sudden onset pain, light sensitivity, and  loss of vision. When then inflammation clears the vision can often be recovered, but occasionally a corneal transplant may be needed to restore vision.  The patient was educated not to rub the eye to avoid hydrops.     Monitor in 6 months with CL / cornea check or sooner if problems arise.

## 2025-09-02 DIAGNOSIS — F39 MOOD DISORDER: ICD-10-CM

## 2025-09-03 RX ORDER — VENLAFAXINE 75 MG/1
75 TABLET ORAL DAILY
Qty: 90 TABLET | Refills: 3 | Status: SHIPPED | OUTPATIENT
Start: 2025-09-03

## 2025-09-03 RX ORDER — BUPROPION HYDROCHLORIDE 300 MG/1
300 TABLET ORAL DAILY
Qty: 90 TABLET | Refills: 3 | Status: SHIPPED | OUTPATIENT
Start: 2025-09-03

## 2026-01-30 ENCOUNTER — APPOINTMENT (OUTPATIENT)
Dept: OPHTHALMOLOGY | Facility: CLINIC | Age: 31
End: 2026-01-30